# Patient Record
Sex: MALE | Race: WHITE | NOT HISPANIC OR LATINO | Employment: FULL TIME | URBAN - METROPOLITAN AREA
[De-identification: names, ages, dates, MRNs, and addresses within clinical notes are randomized per-mention and may not be internally consistent; named-entity substitution may affect disease eponyms.]

---

## 2017-12-01 ENCOUNTER — OFFICE VISIT - HEALTHEAST (OUTPATIENT)
Dept: FAMILY MEDICINE | Facility: CLINIC | Age: 32
End: 2017-12-01

## 2017-12-01 DIAGNOSIS — Z00.00 WELLNESS EXAMINATION: ICD-10-CM

## 2017-12-01 LAB
CHOLEST SERPL-MCNC: 198 MG/DL
FASTING STATUS PATIENT QL REPORTED: YES
HDLC SERPL-MCNC: 39 MG/DL
LDLC SERPL CALC-MCNC: 145 MG/DL
TRIGL SERPL-MCNC: 71 MG/DL

## 2017-12-01 ASSESSMENT — MIFFLIN-ST. JEOR: SCORE: 2021.48

## 2017-12-04 ENCOUNTER — COMMUNICATION - HEALTHEAST (OUTPATIENT)
Dept: FAMILY MEDICINE | Facility: CLINIC | Age: 32
End: 2017-12-04

## 2018-03-11 ENCOUNTER — OFFICE VISIT - HEALTHEAST (OUTPATIENT)
Dept: FAMILY MEDICINE | Facility: CLINIC | Age: 33
End: 2018-03-11

## 2018-03-11 DIAGNOSIS — J02.9 SORE THROAT: ICD-10-CM

## 2018-03-11 LAB — DEPRECATED S PYO AG THROAT QL EIA: NORMAL

## 2018-03-12 LAB — GROUP A STREP BY PCR: NORMAL

## 2018-04-16 ENCOUNTER — TELEPHONE (OUTPATIENT)
Dept: OTHER | Facility: CLINIC | Age: 33
End: 2018-04-16

## 2018-11-19 ENCOUNTER — COMMUNICATION - HEALTHEAST (OUTPATIENT)
Dept: FAMILY MEDICINE | Facility: CLINIC | Age: 33
End: 2018-11-19

## 2018-11-19 ENCOUNTER — OFFICE VISIT - HEALTHEAST (OUTPATIENT)
Dept: FAMILY MEDICINE | Facility: CLINIC | Age: 33
End: 2018-11-19

## 2018-11-19 DIAGNOSIS — E66.3 OVERWEIGHT: ICD-10-CM

## 2018-11-19 DIAGNOSIS — Z00.00 WELLNESS EXAMINATION: ICD-10-CM

## 2018-11-19 LAB
ANION GAP SERPL CALCULATED.3IONS-SCNC: 8 MMOL/L (ref 5–18)
BUN SERPL-MCNC: 14 MG/DL (ref 8–22)
CALCIUM SERPL-MCNC: 9.5 MG/DL (ref 8.5–10.5)
CHLORIDE BLD-SCNC: 107 MMOL/L (ref 98–107)
CHOLEST SERPL-MCNC: 188 MG/DL
CO2 SERPL-SCNC: 26 MMOL/L (ref 22–31)
CREAT SERPL-MCNC: 1.11 MG/DL (ref 0.7–1.3)
ERYTHROCYTE [DISTWIDTH] IN BLOOD BY AUTOMATED COUNT: 11.4 % (ref 11–14.5)
FASTING STATUS PATIENT QL REPORTED: YES
GFR SERPL CREATININE-BSD FRML MDRD: >60 ML/MIN/1.73M2
GLUCOSE BLD-MCNC: 97 MG/DL (ref 70–125)
HCT VFR BLD AUTO: 45.2 % (ref 40–54)
HDLC SERPL-MCNC: 41 MG/DL
HGB BLD-MCNC: 15.3 G/DL (ref 14–18)
LDLC SERPL CALC-MCNC: 132 MG/DL
MCH RBC QN AUTO: 29.3 PG (ref 27–34)
MCHC RBC AUTO-ENTMCNC: 33.9 G/DL (ref 32–36)
MCV RBC AUTO: 86 FL (ref 80–100)
PLATELET # BLD AUTO: 245 THOU/UL (ref 140–440)
PMV BLD AUTO: 9 FL (ref 7–10)
POTASSIUM BLD-SCNC: 4.4 MMOL/L (ref 3.5–5)
RBC # BLD AUTO: 5.23 MILL/UL (ref 4.4–6.2)
SODIUM SERPL-SCNC: 141 MMOL/L (ref 136–145)
TRIGL SERPL-MCNC: 74 MG/DL
WBC: 5.8 THOU/UL (ref 4–11)

## 2018-11-19 RX ORDER — ASCORBIC ACID 500 MG
500 TABLET ORAL DAILY
Status: SHIPPED | COMMUNITY
Start: 2015-11-11

## 2018-11-19 ASSESSMENT — MIFFLIN-ST. JEOR: SCORE: 2076.76

## 2018-12-21 ENCOUNTER — OFFICE VISIT - HEALTHEAST (OUTPATIENT)
Dept: FAMILY MEDICINE | Facility: CLINIC | Age: 33
End: 2018-12-21

## 2018-12-21 DIAGNOSIS — R07.0 THROAT PAIN: ICD-10-CM

## 2018-12-21 DIAGNOSIS — J40 BRONCHITIS: ICD-10-CM

## 2018-12-21 LAB — DEPRECATED S PYO AG THROAT QL EIA: NORMAL

## 2018-12-22 LAB — GROUP A STREP BY PCR: NORMAL

## 2019-11-21 ENCOUNTER — OFFICE VISIT - HEALTHEAST (OUTPATIENT)
Dept: FAMILY MEDICINE | Facility: CLINIC | Age: 34
End: 2019-11-21

## 2019-11-21 ENCOUNTER — COMMUNICATION - HEALTHEAST (OUTPATIENT)
Dept: FAMILY MEDICINE | Facility: CLINIC | Age: 34
End: 2019-11-21

## 2019-11-21 DIAGNOSIS — Z71.89 COUNSELING ON HEALTH CARE DIRECTIVE: ICD-10-CM

## 2019-11-21 DIAGNOSIS — Z00.00 ANNUAL PHYSICAL EXAM: ICD-10-CM

## 2019-11-21 DIAGNOSIS — Z23 NEED FOR MMR VACCINE: ICD-10-CM

## 2019-11-21 LAB
CHOLEST SERPL-MCNC: 186 MG/DL
FASTING STATUS PATIENT QL REPORTED: YES
FASTING STATUS PATIENT QL REPORTED: YES
GLUCOSE BLD-MCNC: 99 MG/DL (ref 79–116)
HDLC SERPL-MCNC: 43 MG/DL
LDLC SERPL CALC-MCNC: 128 MG/DL
TRIGL SERPL-MCNC: 73 MG/DL

## 2019-11-21 RX ORDER — CETIRIZINE HYDROCHLORIDE 10 MG/1
10 TABLET ORAL DAILY
Status: SHIPPED | COMMUNITY
Start: 2019-11-21

## 2019-11-21 RX ORDER — CHLORAL HYDRATE 500 MG
2 CAPSULE ORAL DAILY
Status: SHIPPED | COMMUNITY
Start: 2019-11-21

## 2019-11-21 ASSESSMENT — MIFFLIN-ST. JEOR: SCORE: 1968.19

## 2020-03-10 ENCOUNTER — HEALTH MAINTENANCE LETTER (OUTPATIENT)
Age: 35
End: 2020-03-10

## 2020-12-20 ENCOUNTER — HEALTH MAINTENANCE LETTER (OUTPATIENT)
Age: 35
End: 2020-12-20

## 2021-04-24 ENCOUNTER — HEALTH MAINTENANCE LETTER (OUTPATIENT)
Age: 36
End: 2021-04-24

## 2021-05-18 ENCOUNTER — OFFICE VISIT - HEALTHEAST (OUTPATIENT)
Dept: FAMILY MEDICINE | Facility: CLINIC | Age: 36
End: 2021-05-18

## 2021-05-18 ENCOUNTER — COMMUNICATION - HEALTHEAST (OUTPATIENT)
Dept: TELEHEALTH | Facility: CLINIC | Age: 36
End: 2021-05-18

## 2021-05-18 DIAGNOSIS — Z00.00 WELLNESS EXAMINATION: ICD-10-CM

## 2021-05-18 LAB
ANION GAP SERPL CALCULATED.3IONS-SCNC: 9 MMOL/L (ref 5–18)
BUN SERPL-MCNC: 19 MG/DL (ref 8–22)
CALCIUM SERPL-MCNC: 8.7 MG/DL (ref 8.5–10.5)
CHLORIDE BLD-SCNC: 105 MMOL/L (ref 98–107)
CHOLEST SERPL-MCNC: 184 MG/DL
CO2 SERPL-SCNC: 26 MMOL/L (ref 22–31)
CREAT SERPL-MCNC: 1.25 MG/DL (ref 0.7–1.3)
ERYTHROCYTE [DISTWIDTH] IN BLOOD BY AUTOMATED COUNT: 11.7 % (ref 11–14.5)
FASTING STATUS PATIENT QL REPORTED: YES
GFR SERPL CREATININE-BSD FRML MDRD: >60 ML/MIN/1.73M2
GLUCOSE BLD-MCNC: 97 MG/DL (ref 70–125)
HCT VFR BLD AUTO: 44.3 % (ref 40–54)
HDLC SERPL-MCNC: 46 MG/DL
HGB BLD-MCNC: 14.9 G/DL (ref 14–18)
LDLC SERPL CALC-MCNC: 124 MG/DL
MCH RBC QN AUTO: 29.2 PG (ref 27–34)
MCHC RBC AUTO-ENTMCNC: 33.6 G/DL (ref 32–36)
MCV RBC AUTO: 87 FL (ref 80–100)
PLATELET # BLD AUTO: 272 THOU/UL (ref 140–440)
PMV BLD AUTO: 10.8 FL (ref 7–10)
POTASSIUM BLD-SCNC: 4 MMOL/L (ref 3.5–5)
RBC # BLD AUTO: 5.11 MILL/UL (ref 4.4–6.2)
SODIUM SERPL-SCNC: 140 MMOL/L (ref 136–145)
TRIGL SERPL-MCNC: 70 MG/DL
WBC: 6.3 THOU/UL (ref 4–11)

## 2021-05-18 ASSESSMENT — MIFFLIN-ST. JEOR: SCORE: 2041.9

## 2021-05-27 VITALS
DIASTOLIC BLOOD PRESSURE: 86 MMHG | HEIGHT: 69 IN | SYSTOLIC BLOOD PRESSURE: 110 MMHG | OXYGEN SATURATION: 98 % | HEART RATE: 75 BPM | BODY MASS INDEX: 36.88 KG/M2 | WEIGHT: 249 LBS

## 2021-05-31 VITALS — HEIGHT: 69 IN | BODY MASS INDEX: 36.21 KG/M2 | WEIGHT: 244.5 LBS

## 2021-06-01 VITALS — WEIGHT: 238.3 LBS | BODY MASS INDEX: 35.71 KG/M2

## 2021-06-02 VITALS — WEIGHT: 241.2 LBS | BODY MASS INDEX: 34.61 KG/M2

## 2021-06-02 VITALS — BODY MASS INDEX: 36 KG/M2 | HEIGHT: 70 IN | WEIGHT: 251.44 LBS

## 2021-06-03 NOTE — PATIENT INSTRUCTIONS - HE
"MMR booster given today.    Your hep B should be ok given that I have records if you receiving the full series very recently.    I would encourage some weight loss.  That calorie counting ethan we talked about is called \"Lose It!\".    I usually comment on labs and imaging after they are all resulted within 2 business days. If you haven't heard your results within a week, please contact the office.    Thank you for coming in today!    If you receive a survey from [employer] about your experience today, it would be very helpful if you could fill it out to let us know what went well and what we can improve!    General Information:    Today you had your appointment with Adán Ardon NP    My hours are:    Monday : Out of clinic  Tuesday : 8:00AM - 5:00 PM  Wednesday: 8:00AM - 5:00 PM  Thursday: 8:00AM - 5:00 PM  Friday: 8:00AM - 5:00 PM    I am not in the office Mondays. Therefore non-urgent calls and medical messages received on Monday will be addressed when I am back in the office on Tuesday. Urgent matters will be reviewed and addressed by one of my partners in the office as needed.    If lab work was done today as part of your evaluation you will generally be contacted via [employer], mail, or phone with the results within 1-5 days. If there is an alarming result we will contact you by phone. Lab results come back at varying times, I generally wait until all lab results are available before making comments on the results.     If you need refills please contact your pharmacy. They will send a refill request to me to review. Please allow 3-5 business days for us to process all refill requests.     My Clinical Assistant is Kimberly. Please call us at [phone] or send a medical message with any questions or concerns.     "

## 2021-06-03 NOTE — PROGRESS NOTES
Assessment:      Healthy male exam.      Plan:      1. Annual physical exam  Lipid Cascade FASTING    Glucose   2. Need for MMR vaccine  MMR vaccine subcutaneous   3. Counseling on health care directive       Discussed with the patient that we can either do an adult booster of MMR or check his titers.  He did rather just get a booster today which was performed.  Counseling and healthcare directive provided.  Screening labs ordered.  Congratulated patient on his weight loss and discussed continued strategies for long-term weight loss management.    Subjective:      Vitaly Mcarthur is a 34 y.o. male who presents for an annual exam. The patient reports that there is not domestic violence in his life.  Overall patient is doing okay.  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  Unfortunately he and his wife are currently going through divorce right now which is a little stressful for him.  His primary concern today is his immunization status for MMR.  I do not have any records of him receiving MMR in the past and he is not 100% positive he did.  His job includes exposure to many communities that are under vaccinated and so he is wondering about what next steps are.    Healthy Habits:   Regular Exercise: Yes  Sunscreen Use: Yes  Healthy Diet: Yes  Dental Visits Regularly: Yes  Seat Belt: Yes  Sexually active: No  Monthly Self Testicular Exams:  Yes  Hemoccults: No  Flex Sig: No  Colonoscopy: No  Lipid Profile: Yes  Glucose Screen: Yes  Prevention of Osteoporosis: Yes  Last Dexa: No  Guns at Home:  Yes  Gun safe/class:  Yes      Immunization History   Administered Date(s) Administered     Hep B, Adult 09/14/2011, 10/25/2011, 07/09/2015     Influenza, Seasonal, Inj PF IIV3 11/25/2014     Influenza, inj, historic,unspecified 09/01/2017, 10/01/2018     Tdap 10/08/2012     Immunization status: up to date and documented.    No exam data present    Current Outpatient  Medications   Medication Sig Dispense Refill     ascorbic acid, vitamin C, (VITAMIN C) 500 MG tablet Take 500 mg by mouth.       cetirizine (ZYRTEC) 10 MG tablet Take 10 mg by mouth daily.       cholecalciferol, vitamin D3, (CHOLECALCIFEROL) 1,000 unit (25 mcg) tablet Take 1,000 Units by mouth daily.       multivitamin (ONE A DAY) per tablet Take 1 tablet by mouth.       omega-3/dha/epa/fish oil (FISH OIL-OMEGA-3 FATTY ACIDS) 300-1,000 mg capsule Take 2 g by mouth daily.       ubidecarenone (CO Q-10 ORAL) Take by mouth.       ZINC ORAL Take by mouth.       No current facility-administered medications for this visit.      No past medical history on file.  Past Surgical History:   Procedure Laterality Date     [place] REMOVE TONSILS/ADENOIDS,<13 Y/O      Description: Tonsillectomy With Adenoidectomy;  Recorded: 11/25/2014;     Patient has no known allergies.  No family history on file.  Social History     Socioeconomic History     Marital status:      Spouse name: Not on file     Number of children: Not on file     Years of education: Not on file     Highest education level: Not on file   Occupational History     Not on file   Social Needs     Financial resource strain: Not on file     Food insecurity:     Worry: Not on file     Inability: Not on file     Transportation needs:     Medical: Not on file     Non-medical: Not on file   Tobacco Use     Smoking status: Never Smoker     Smokeless tobacco: Never Used   Substance and Sexual Activity     Alcohol use: Not on file     Drug use: Not on file     Sexual activity: Not on file   Lifestyle     Physical activity:     Days per week: Not on file     Minutes per session: Not on file     Stress: Not on file   Relationships     Social connections:     Talks on phone: Not on file     Gets together: Not on file     Attends Worship service: Not on file     Active member of club or organization: Not on file     Attends meetings of clubs or organizations: Not on file      "Relationship status: Not on file     Intimate partner violence:     Fear of current or ex partner: Not on file     Emotionally abused: Not on file     Physically abused: Not on file     Forced sexual activity: Not on file   Other Topics Concern     Not on file   Social History Narrative     Not on file       Review of Systems  General:  Denies problem  Eyes: Denies problem  Ears/Nose/Throat: Denies problem  Cardiovascular: Denies problem  Respiratory:  Denies problem  Gastrointestinal:  Denies problem  Genitourinary: Denies problem  Musculoskeletal:  Denies problem  Skin: Denies problem  Neurologic: Denies problem  Psychiatric: Denies problem  Endocrine: Denies problem  Heme/Lymphatic: Denies problem   Allergic/Immunologic: Denies problem        Objective:     Vitals:    11/21/19 0805   BP: 128/78   Pulse: 79   Temp: 97.8  F (36.6  C)   TempSrc: Oral   SpO2: 96%   Weight: (!) 231 lb (104.8 kg)   Height: 5' 9\" (1.753 m)     Body mass index is 34.11 kg/m .    Physical  General Appearance: Alert, cooperative, no distress, appears stated age  Head: Normocephalic, without obvious abnormality, atraumatic  Eyes: PERRL, conjunctiva/corneas clear, EOM's intact  Ears: Normal TM's and external ear canals, both ears  Nose: Nares normal, septum midline,mucosa normal, no drainage  Throat: Lips, mucosa, and tongue normal; teeth and gums normal  Neck: Supple, symmetrical, trachea midline, no adenopathy;  thyroid: not enlarged, symmetric, no tenderness/mass/nodules; no carotid bruit or JVD  Back: Symmetric, no curvature, ROM normal, no CVA tenderness  Lungs: Clear to auscultation bilaterally, respirations unlabored  Heart: Regular rate and rhythm, S1 and S2 normal, no murmur, rub, or gallop,  Abdomen: Soft, non-tender, bowel sounds active all four quadrants,  no masses, no organomegaly  Genitourinary: Penis normal. Right testis is descended. Left testis is descended.   Musculoskeletal: Normal range of motion. No joint swelling or " deformity.   Extremities: Extremities normal, atraumatic, no cyanosis or edema  Skin: Skin color, texture, turgor normal, no rashes or lesions  Lymph nodes: Cervical, supraclavicular, and axillary nodes normal  Neurologic: He is alert. He has normal reflexes.   Psychiatric: He has a normal mood and affect.      Adán Ardon, CNP

## 2021-06-04 VITALS
BODY MASS INDEX: 34.21 KG/M2 | TEMPERATURE: 97.8 F | OXYGEN SATURATION: 96 % | WEIGHT: 231 LBS | DIASTOLIC BLOOD PRESSURE: 78 MMHG | HEART RATE: 79 BPM | SYSTOLIC BLOOD PRESSURE: 128 MMHG | HEIGHT: 69 IN

## 2021-06-14 NOTE — PROGRESS NOTES
"Chief Complaint   Patient presents with     Annual Exam     Px       HPI: Very pleasant 32-year-old gentleman presents today for physical examination.  He is a [employer] , is  and has twin daughters.  He is healthy and has no complaints.  He works out frequently at the local fitness establishment.    ROS: No bowel or bladder issues.  No fever chills.    SH: He does not smoke   FH: The Patient's family history is not on file.    Meds:  Vitaly currently has no medications in their medication list.    O:  /72  Pulse 76  Temp 97.6  F (36.4  C)  Resp 20  Ht 5' 8.5\" (1.74 m)  Wt (!) 244 lb 8 oz (110.9 kg)  SpO2 97%  BMI 36.64 kg/m2   Constitutional:    --Vitals as above  --No acute distress  Eyes-  --Sclera noninjected  --Lids and conjunctiva normal  ENT-  --TMs clear  --Sclera noninjected  --Pharynx not erythematous  Neck-  --Neck supple with no cervical lymphadenopathy  Lungs-  --Clear to Auscultation  Heart-  --Regular rate and rhythm  Abdomen--  --Soft, non-tender, non-distended  Skin-  --Pink and dry  Psych-  --Alert and oriented  --Normal affect      A/P:   1. Wellness examination  -Encouraged healthy weight  - HM2(CBC w/o Differential)  - Basic Metabolic Panel  - Lipid Cascade                                          "

## 2021-06-16 NOTE — PROGRESS NOTES
"  Assessment/Plan:     Patient presents with symptoms concerning for strep pharyngitis.  Rapid strep swab returned negative, final culture pending.  Handout on symptomatic treatment for sore throats was given to patient.    Problem List Items Addressed This Visit     None      Visit Diagnoses     Sore throat    -  Primary    Relevant Orders    Rapid Strep A Screen-Throat          Return to clinic PRN.    Total time spent with patient was 15 minutes with greater than 50% spent in face-to-face counseling regarding the above plan.    Subjective:      Vitaly Mcarthur is a 32 y.o. male who presents to clinic for sore throat.    Patient has been feeling poorly for the past 3 or 4 days.  It is worsening.  He is having no shortness of breath or difficulty breathing, but he does endorse some difficulty swallowing.  His biggest concern is a sore throat.  He has been treating with Mucinex and Sudafed.  He expected with just a mild cold but the sore throat is bothering him significantly.  He is not endorsing any other significant sinus symptoms, although this may be mitigated by the medication is taking preemptively.  He is not having any gastrointestinal symptoms.  He does not feel as though the \"cold is in his chest\".  He is not prone to sore throats.  He specifically denies fever and cough.    Past Medical History, Family History, and Social History reviewed.     No current outpatient prescriptions on file prior to visit.     No current facility-administered medications on file prior to visit.        Review of systems is as stated in HPI.  The remainder of the 10 system review is otherwise negative.    Objective:     /78 (Patient Site: Right Arm, Patient Position: Sitting, Cuff Size: Adult Large)  Pulse 89  Temp 98.4  F (36.9  C) (Oral)   Resp 16  Wt (!) 238 lb 4.8 oz (108.1 kg)  SpO2 98%  BMI 35.71 kg/m2 Body mass index is 35.71 kg/(m^2).    Gen: Alert, NAD, appears stated age, normal hygiene   Eyes: conjunctivae " without injection, sclera clear, EOMI  ENT/mouth: nares clear, septum midline, absent rhinorrhea, significant pharyngeal injection with tonsillar edema, neck is supple, no thyroid enlargement  CV: RRR, no murmur appreciated, pedal edema absent bilaterally  Resp: CTAB, no wheezes, rales or ronchi  ABD: non-tender to palpation, nondistended  MSK: grossly full range of motion in all joints, no obvious deformity  Neuro: CN II-XII grossly intact, no deficits in coordination  Psych: no apparent hallucinations or delusions, no pressured speech; alert, oriented x3  SKIN: dry and without lesions  Heme/lymph: no pallor, no active bleeding/bruising, no adenopathy appreciated    This note has been dictated using voice recognition software. Any grammatical or context distortions are unintentional and inherent to the the software.     Kathy Coppola MD

## 2021-06-17 NOTE — PROGRESS NOTES
S:  35-year-old male who works as a  in [employer].  He presents today for annual physical.  His stress level is increased slightly as he is currently undergoing a divorce.  He works out 5 days a week in a gym and lifts weights vigorously.  He has no medical concerns.    Medications: Zyrtec, multivitamins    O:   Blood pressure 110/86, pulse 75, respiration 12, weight 249  Constitutional:    --Vitals as above  --No acute distress  Eyes-  --Sclera noninjected  --Lids and conjunctiva normal  ENT-  --TMs clear  --Sclera noninjected  --Pharynx not erythematous  Neck-  --Neck supple with no cervical lymphadenopathy  Lungs-  --Clear to Auscultation  Heart-  --Regular rate and rhythm  Abdomen--  --Soft, non-tender, non-distended  Skin-  --Pink and dry  Psych-  --Alert and oriented  --Normal affect      A:   Healthy male  Over nourishment    P:   Encouraged healthy lifestyle and exercise  Encourage weight loss  Reviewed immunizations  CBC BMP lipid  RTC 1 year

## 2021-06-19 NOTE — LETTER
Letter by Adán Ardon CNP at      Author: Adán Ardon CNP Service: -- Author Type: --    Filed:  Encounter Date: 11/21/2019 Status: Signed         Vitaly Mcarthur  [address] [place] [facility] [address]             November 21, 2019         Dear Mr. Mcarthur,    Below are the results from your recent visit:    Resulted Orders   Lipid [place] FASTING   Result Value Ref Range    Cholesterol 186 <=199 mg/dL    Triglycerides 73 <=149 mg/dL    HDL Cholesterol 43 >=40 mg/dL    LDL Calculated 128 <=129 mg/dL    Patient Fasting > 8hrs? Yes    Glucose   Result Value Ref Range    Glucose 99 79 - 116 mg/dL    Patient Fasting > 8hrs? Yes     Narrative    Fasting Glucose reference range is 70-99 mg/dL per  American Diabetes Association (ADA) guidelines.       Fasting blood sugar and cholesterol look fine!    Please call with questions or contact us using MyChart.    Sincerely,         Adán Ardon CNP

## 2021-06-21 NOTE — PROGRESS NOTES
Assessment:      Healthy male exam.      Plan:    1. Wellness examination  We discussed wellness items including exercise, weight loss, and healthy sleep patterns.  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  He is also requesting that his vitamin D be checked because he has a coworker was deficient in vitamin D  - HM2(CBC w/o Differential)  - Basic Metabolic Panel  - Lipid Cascade    2. Overweight  We discussed his weight and he lifts weights for exercise.  The patient is aware that he needs to lose weight and will continue to be working on that       All questions answered.     Subjective:      Vitaly Mcarthur is a 33 y.o. male who presents for an annual exam. Vitaly presents today for physical exam.  He works in the [employer] in [place] as a .  Unfortunately he works nights in his wake sleep pattern is irregular.  He also has twin girls which require a fair amount of time.  He is concerned about not getting enough sleep.  He is also requesting vitamin D to be checked because he had a coworker who was deficient in vitamin D.    Healthy Habits:   Regular Exercise: Yes and No  Sunscreen Use: No  Healthy Diet: No  Dental Visits Regularly: Yes  Seat Belt: Yes  Sexually active: Yes  Monthly Self Testicular Exams:  No  Hemoccults: No  Flex Sig: No  Colonoscopy: No  Lipid Profile: Yes  Glucose Screen: Yes  Prevention of Osteoporosis: Yes  Last Dexa: No  Guns at Home:  Yes  Guns Safety Locks:  Yes      Immunization History   Administered Date(s) Administered     Influenza, Seasonal, Inj PF IIV3 11/25/2014     Influenza, inj, historic,unspecified 09/01/2017     Immunization status: up to date and documented.    No exam data present     Current Outpatient Medications   Medication Sig Dispense Refill     ascorbic acid, vitamin C, (VITAMIN C) 500 MG tablet Take 500 mg by mouth.       multivitamin (ONE A DAY) per tablet  "Take 1 tablet by mouth.       No current facility-administered medications for this visit.      No past medical history on file.  Past Surgical History:   Procedure Laterality Date     [place] REMOVE TONSILS/ADENOIDS,<10 Y/O      Description: Tonsillectomy With Adenoidectomy;  Recorded: 11/25/2014;     Patient has no known allergies.  No family history on file.  Social History     Socioeconomic History     Marital status:      Spouse name: Not on file     Number of children: Not on file     Years of education: Not on file     Highest education level: Not on file   Social Needs     Financial resource strain: Not on file     Food insecurity - worry: Not on file     Food insecurity - inability: Not on file     Transportation needs - medical: Not on file     Transportation needs - non-medical: Not on file   Occupational History     Not on file   Tobacco Use     Smoking status: Never Smoker     Smokeless tobacco: Never Used   Substance and Sexual Activity     Alcohol use: Not on file     Drug use: Not on file     Sexual activity: Not on file   Other Topics Concern     Not on file   Social History Narrative     Not on file       Review of Systems  Review of Systems          Objective:     Vitals:    11/19/18 0807   BP: 124/80   Pulse: 76   Resp: 16   SpO2: 96%   Weight: (!) 251 lb 7 oz (114.1 kg)   Height: 5' 10\" (1.778 m)     Body mass index is 36.08 kg/m .    Physical  Physical Exam     Constitutional:    --Vitals as above  --No acute distress  Eyes-  --Sclera noninjected  --Lids and conjunctiva normal  ENT-  --TMs clear  --Sclera noninjected  --Pharynx not erythematous  Neck-  --Neck supple with no cervical lymphadenopathy  Lungs-  --Clear to Auscultation  Heart-  --Regular rate and rhythm  Abdomen--  --Soft, non-tender, non-distended  Skin-  --Pink and dry  Psych-  --Alert and oriented  --Normal affect        "

## 2021-06-22 NOTE — PROGRESS NOTES
Assessment:     1. Throat pain  Rapid Strep A Screen-Throat    Group A Strep, RNA Direct Detection, Throat   2. Bronchitis  amoxicillin-clavulanate (AUGMENTIN) 875-125 mg per tablet     Results for orders placed or performed in visit on 12/21/18   Rapid Strep A Screen-Throat   Result Value Ref Range    Rapid Strep A Antigen No Group A Strep detected, presumptive negative No Group A Strep detected, presumptive negative            Plan:     Differential diagnosis include but not limited to pharyngitis, bacterial bronchitis, strep pharyngitis.  Discussed with the patient on exam finding, abnormal lung sounds, patient also coughing throughout the visit.  Rapid strep done, negative.  We will treat patient for bacterial bronchitis with Augmentin twice daily times 10 days.  Advised patient he may also use over-the-counter medication Delsym or Mucinex for the cough.  Increase fluid intake.  May take ibuprofen or Tylenol for headache.  Monitor for worsening symptoms    I discussed red flag symptoms, return precautions to clinic/ER and follow up care with patient/parent.  Expected clinical course, symptomatic cares advised. Questions answered. Patient/parent amenable with plan.       Subjective:       33 y.o. male presents for evaluation of a cough.  Patient reports that he has been having symptoms for about 2 weeks.  He also admits to a sore throat for the past 3 days.  He has been having body aches and chills that was only yesterday which resolved after taking ibuprofen.  He admits that initially his cough was getting better and now he feels like it is getting worse.  The cough is nonproductive but is deep and is constant.  He denies nausea, vomiting, diarrhea, loss of appetite.  He admits to shortness of breath with extensive coughing.  Patient works as a  therefore not sure if he has been exposed to bronchitis or pneumonia.  He has been taking Mucinex for the past 2 weeks that gives him no symptom relief.   He also has been taking Sudafed that is not effective.  The last few nights he has been using NyQuil at night therefore he could sleep which he wakes up at least 2-3 times a night.    The following portions of the patient's history were reviewed and updated as appropriate: allergies, current medications, past family history, past medical history, past social history, past surgical history and problem list.    Review of Systems  A 12 point comprehensive review of systems was negative except as noted.      Objective:      /72 (Patient Site: Right Arm, Patient Position: Sitting, Cuff Size: Adult Large)   Pulse (!) 101   Temp 98.1  F (36.7  C) (Oral)   Resp 16   Wt (!) 241 lb 3.2 oz (109.4 kg)   SpO2 96%   BMI 34.61 kg/m    General appearance: alert, appears stated age, cooperative and moderate distress  Head: Normocephalic, without obvious abnormality, atraumatic, sinuses nontender to percussion  Eyes: conjunctivae/corneas clear. PERRL, EOM's intact. Fundi benign.  Ears: abnormal TM right ear - bulging and air-fluid level and abnormal TM left ear - bulging and air-fluid level  Nose: Nares normal. Septum midline. Mucosa normal. No drainage or sinus tenderness., copious and mucoid discharge, moderate congestion, no sinus tenderness  Throat: abnormal findings: moderate oropharyngeal erythema  Lungs: rhonchi bilaterally  Heart: regular rate and rhythm, S1, S2 normal, no murmur, click, rub or gallop  Extremities: extremities normal, atraumatic, no cyanosis or edema  Pulses: 2+ and symmetric  Skin: Skin color, texture, turgor normal. No rashes or lesions  Lymph nodes: Cervical, supraclavicular, and axillary nodes normal.  Neurologic: Grossly normal        This note has been dictated using voice recognition software. Any grammatical or context distortions are unintentional and inherent to the software

## 2021-08-02 ENCOUNTER — HOSPITAL ENCOUNTER (INPATIENT)
Facility: CLINIC | Age: 36
Setting detail: SURGERY ADMIT
End: 2021-08-02
Attending: SPECIALIST | Admitting: SPECIALIST
Payer: COMMERCIAL

## 2021-08-02 ENCOUNTER — APPOINTMENT (OUTPATIENT)
Dept: CT IMAGING | Facility: CLINIC | Age: 36
End: 2021-08-02
Attending: EMERGENCY MEDICINE
Payer: COMMERCIAL

## 2021-08-02 ENCOUNTER — ANESTHESIA EVENT (OUTPATIENT)
Dept: SURGERY | Facility: CLINIC | Age: 36
End: 2021-08-02
Payer: COMMERCIAL

## 2021-08-02 ENCOUNTER — HOSPITAL ENCOUNTER (OUTPATIENT)
Facility: CLINIC | Age: 36
Discharge: HOME | End: 2021-08-02
Attending: EMERGENCY MEDICINE | Admitting: EMERGENCY MEDICINE
Payer: COMMERCIAL

## 2021-08-02 ENCOUNTER — ANESTHESIA (OUTPATIENT)
Dept: SURGERY | Facility: CLINIC | Age: 36
End: 2021-08-02
Payer: COMMERCIAL

## 2021-08-02 DIAGNOSIS — K35.30 ACUTE APPENDICITIS WITH LOCALIZED PERITONITIS, UNSPECIFIED WHETHER ABSCESS PRESENT, UNSPECIFIED WHETHER GANGRENE PRESENT, UNSPECIFIED WHETHER PERFORATION PRESENT: Primary | ICD-10-CM

## 2021-08-02 DIAGNOSIS — K35.30 ACUTE APPENDICITIS WITH LOCALIZED PERITONITIS, UNSPECIFIED WHETHER ABSCESS PRESENT, UNSPECIFIED WHETHER GANGRENE PRESENT, UNSPECIFIED WHETHER PERFORATION PRESENT: ICD-10-CM

## 2021-08-02 LAB
ALBUMIN UR-MCNC: 30 MG/DL
ANION GAP SERPL CALCULATED.3IONS-SCNC: 9 MMOL/L (ref 5–18)
APPEARANCE UR: CLEAR
BASOPHILS # BLD AUTO: 0 10E3/UL (ref 0–0.2)
BASOPHILS NFR BLD AUTO: 0 %
BILIRUB UR QL STRIP: NEGATIVE
BUN SERPL-MCNC: 13 MG/DL (ref 8–22)
CALCIUM SERPL-MCNC: 9.3 MG/DL (ref 8.5–10.5)
CHLORIDE BLD-SCNC: 107 MMOL/L (ref 98–107)
CO2 SERPL-SCNC: 23 MMOL/L (ref 22–31)
COLOR UR AUTO: ABNORMAL
CREAT SERPL-MCNC: 1.13 MG/DL (ref 0.7–1.3)
EOSINOPHIL # BLD AUTO: 0 10E3/UL (ref 0–0.7)
EOSINOPHIL NFR BLD AUTO: 0 %
ERYTHROCYTE [DISTWIDTH] IN BLOOD BY AUTOMATED COUNT: 11.7 % (ref 10–15)
GFR SERPL CREATININE-BSD FRML MDRD: 83 ML/MIN/1.73M2
GLUCOSE BLD-MCNC: 120 MG/DL (ref 70–125)
GLUCOSE UR STRIP-MCNC: NEGATIVE MG/DL
HCT VFR BLD AUTO: 45.2 % (ref 40–53)
HGB BLD-MCNC: 14.9 G/DL (ref 13.3–17.7)
HGB UR QL STRIP: ABNORMAL
HOLD SPECIMEN: NORMAL
HOLD SPECIMEN: NORMAL
IMM GRANULOCYTES # BLD: 0.1 10E3/UL
IMM GRANULOCYTES NFR BLD: 0 %
KETONES UR STRIP-MCNC: NEGATIVE MG/DL
LEUKOCYTE ESTERASE UR QL STRIP: NEGATIVE
LYMPHOCYTES # BLD AUTO: 0.7 10E3/UL (ref 0.8–5.3)
LYMPHOCYTES NFR BLD AUTO: 5 %
MCH RBC QN AUTO: 28.6 PG (ref 26.5–33)
MCHC RBC AUTO-ENTMCNC: 33 G/DL (ref 31.5–36.5)
MCV RBC AUTO: 87 FL (ref 78–100)
MONOCYTES # BLD AUTO: 1.1 10E3/UL (ref 0–1.3)
MONOCYTES NFR BLD AUTO: 7 %
NEUTROPHILS # BLD AUTO: 14 10E3/UL (ref 1.6–8.3)
NEUTROPHILS NFR BLD AUTO: 88 %
NITRATE UR QL: NEGATIVE
NRBC # BLD AUTO: 0 10E3/UL
NRBC BLD AUTO-RTO: 0 /100
PH UR STRIP: 6 [PH] (ref 5–7)
PLATELET # BLD AUTO: 259 10E3/UL (ref 150–450)
POTASSIUM BLD-SCNC: 4.3 MMOL/L (ref 3.5–5)
RBC # BLD AUTO: 5.21 10E6/UL (ref 4.4–5.9)
RBC URINE: 1 /HPF
SARS-COV-2 RNA RESP QL NAA+PROBE: NEGATIVE
SODIUM SERPL-SCNC: 139 MMOL/L (ref 136–145)
SP GR UR STRIP: >1.03 (ref 1–1.03)
UROBILINOGEN UR STRIP-MCNC: <2 MG/DL
WBC # BLD AUTO: 15.9 10E3/UL (ref 4–11)
WBC URINE: 1 /HPF

## 2021-08-02 PROCEDURE — 96375 TX/PRO/DX INJ NEW DRUG ADDON: CPT

## 2021-08-02 PROCEDURE — 370N000017 HC ANESTHESIA TECHNICAL FEE, PER MIN: Performed by: SPECIALIST

## 2021-08-02 PROCEDURE — 258N000001 HC RX 258: Performed by: SPECIALIST

## 2021-08-02 PROCEDURE — 81001 URINALYSIS AUTO W/SCOPE: CPT | Performed by: EMERGENCY MEDICINE

## 2021-08-02 PROCEDURE — 250N000011 HC RX IP 250 OP 636: Performed by: EMERGENCY MEDICINE

## 2021-08-02 PROCEDURE — 250N000009 HC RX 250: Performed by: NURSE ANESTHETIST, CERTIFIED REGISTERED

## 2021-08-02 PROCEDURE — 44970 LAPAROSCOPY APPENDECTOMY: CPT | Performed by: SPECIALIST

## 2021-08-02 PROCEDURE — 250N000025 HC SEVOFLURANE, PER MIN: Performed by: SPECIALIST

## 2021-08-02 PROCEDURE — 250N000011 HC RX IP 250 OP 636: Performed by: NURSE ANESTHETIST, CERTIFIED REGISTERED

## 2021-08-02 PROCEDURE — 710N000012 HC RECOVERY PHASE 2, PER MINUTE: Performed by: SPECIALIST

## 2021-08-02 PROCEDURE — C9803 HOPD COVID-19 SPEC COLLECT: HCPCS

## 2021-08-02 PROCEDURE — 272N000001 HC OR GENERAL SUPPLY STERILE: Performed by: SPECIALIST

## 2021-08-02 PROCEDURE — 258N000003 HC RX IP 258 OP 636: Performed by: NURSE ANESTHETIST, CERTIFIED REGISTERED

## 2021-08-02 PROCEDURE — 85025 COMPLETE CBC W/AUTO DIFF WBC: CPT | Performed by: EMERGENCY MEDICINE

## 2021-08-02 PROCEDURE — 93005 ELECTROCARDIOGRAM TRACING: CPT

## 2021-08-02 PROCEDURE — 74177 CT ABD & PELVIS W/CONTRAST: CPT

## 2021-08-02 PROCEDURE — 36415 COLL VENOUS BLD VENIPUNCTURE: CPT | Performed by: EMERGENCY MEDICINE

## 2021-08-02 PROCEDURE — 250N000011 HC RX IP 250 OP 636: Performed by: SPECIALIST

## 2021-08-02 PROCEDURE — 87635 SARS-COV-2 COVID-19 AMP PRB: CPT | Performed by: EMERGENCY MEDICINE

## 2021-08-02 PROCEDURE — 96365 THER/PROPH/DIAG IV INF INIT: CPT | Mod: 59

## 2021-08-02 PROCEDURE — 80048 BASIC METABOLIC PNL TOTAL CA: CPT | Performed by: EMERGENCY MEDICINE

## 2021-08-02 PROCEDURE — 710N000010 HC RECOVERY PHASE 1, LEVEL 2, PER MIN: Performed by: SPECIALIST

## 2021-08-02 PROCEDURE — 360N000076 HC SURGERY LEVEL 3, PER MIN: Performed by: SPECIALIST

## 2021-08-02 PROCEDURE — 93005 ELECTROCARDIOGRAM TRACING: CPT | Performed by: EMERGENCY MEDICINE

## 2021-08-02 PROCEDURE — 99285 EMERGENCY DEPT VISIT HI MDM: CPT | Mod: 25

## 2021-08-02 PROCEDURE — 99204 OFFICE O/P NEW MOD 45 MIN: CPT | Mod: 57 | Performed by: SPECIALIST

## 2021-08-02 PROCEDURE — 88304 TISSUE EXAM BY PATHOLOGIST: CPT | Mod: TC | Performed by: SPECIALIST

## 2021-08-02 RX ORDER — SODIUM CHLORIDE, SODIUM LACTATE, POTASSIUM CHLORIDE, CALCIUM CHLORIDE 600; 310; 30; 20 MG/100ML; MG/100ML; MG/100ML; MG/100ML
INJECTION, SOLUTION INTRAVENOUS CONTINUOUS PRN
Status: DISCONTINUED | OUTPATIENT
Start: 2021-08-02 | End: 2021-08-02

## 2021-08-02 RX ORDER — ONDANSETRON 2 MG/ML
4 INJECTION INTRAMUSCULAR; INTRAVENOUS EVERY 30 MIN PRN
Status: DISCONTINUED | OUTPATIENT
Start: 2021-08-02 | End: 2021-08-03 | Stop reason: HOSPADM

## 2021-08-02 RX ORDER — IOPAMIDOL 755 MG/ML
100 INJECTION, SOLUTION INTRAVASCULAR ONCE
Status: COMPLETED | OUTPATIENT
Start: 2021-08-02 | End: 2021-08-02

## 2021-08-02 RX ORDER — HYDROMORPHONE HCL IN WATER/PF 6 MG/30 ML
0.2 PATIENT CONTROLLED ANALGESIA SYRINGE INTRAVENOUS EVERY 5 MIN PRN
Status: DISCONTINUED | OUTPATIENT
Start: 2021-08-02 | End: 2021-08-02 | Stop reason: HOSPADM

## 2021-08-02 RX ORDER — KETOROLAC TROMETHAMINE 15 MG/ML
15 INJECTION, SOLUTION INTRAMUSCULAR; INTRAVENOUS ONCE
Status: COMPLETED | OUTPATIENT
Start: 2021-08-02 | End: 2021-08-02

## 2021-08-02 RX ORDER — ONDANSETRON 2 MG/ML
4 INJECTION INTRAMUSCULAR; INTRAVENOUS ONCE
Status: COMPLETED | OUTPATIENT
Start: 2021-08-02 | End: 2021-08-02

## 2021-08-02 RX ORDER — LIDOCAINE 40 MG/G
CREAM TOPICAL
Status: DISCONTINUED | OUTPATIENT
Start: 2021-08-02 | End: 2021-08-03 | Stop reason: HOSPADM

## 2021-08-02 RX ORDER — PROPOFOL 10 MG/ML
INJECTION, EMULSION INTRAVENOUS PRN
Status: DISCONTINUED | OUTPATIENT
Start: 2021-08-02 | End: 2021-08-02

## 2021-08-02 RX ORDER — PIPERACILLIN SODIUM, TAZOBACTAM SODIUM 3; .375 G/15ML; G/15ML
3.38 INJECTION, POWDER, LYOPHILIZED, FOR SOLUTION INTRAVENOUS ONCE
Status: COMPLETED | OUTPATIENT
Start: 2021-08-02 | End: 2021-08-02

## 2021-08-02 RX ORDER — HALOPERIDOL 5 MG/ML
1 INJECTION INTRAMUSCULAR
Status: DISCONTINUED | OUTPATIENT
Start: 2021-08-02 | End: 2021-08-03 | Stop reason: HOSPADM

## 2021-08-02 RX ORDER — ONDANSETRON 2 MG/ML
INJECTION INTRAMUSCULAR; INTRAVENOUS PRN
Status: DISCONTINUED | OUTPATIENT
Start: 2021-08-02 | End: 2021-08-02

## 2021-08-02 RX ORDER — OXYCODONE HYDROCHLORIDE 5 MG/1
5 TABLET ORAL EVERY 4 HOURS PRN
Status: DISCONTINUED | OUTPATIENT
Start: 2021-08-02 | End: 2021-08-03 | Stop reason: HOSPADM

## 2021-08-02 RX ORDER — FENTANYL CITRATE 50 UG/ML
INJECTION, SOLUTION INTRAMUSCULAR; INTRAVENOUS PRN
Status: DISCONTINUED | OUTPATIENT
Start: 2021-08-02 | End: 2021-08-02

## 2021-08-02 RX ORDER — FENTANYL CITRATE 50 UG/ML
25 INJECTION, SOLUTION INTRAMUSCULAR; INTRAVENOUS EVERY 5 MIN PRN
Status: DISCONTINUED | OUTPATIENT
Start: 2021-08-02 | End: 2021-08-02 | Stop reason: HOSPADM

## 2021-08-02 RX ORDER — MEPERIDINE HYDROCHLORIDE 25 MG/ML
12.5 INJECTION INTRAMUSCULAR; INTRAVENOUS; SUBCUTANEOUS
Status: DISCONTINUED | OUTPATIENT
Start: 2021-08-02 | End: 2021-08-03 | Stop reason: HOSPADM

## 2021-08-02 RX ORDER — BUPIVACAINE HYDROCHLORIDE 2.5 MG/ML
INJECTION, SOLUTION INFILTRATION; PERINEURAL PRN
Status: DISCONTINUED | OUTPATIENT
Start: 2021-08-02 | End: 2021-08-02 | Stop reason: HOSPADM

## 2021-08-02 RX ORDER — SODIUM CHLORIDE, SODIUM LACTATE, POTASSIUM CHLORIDE, CALCIUM CHLORIDE 600; 310; 30; 20 MG/100ML; MG/100ML; MG/100ML; MG/100ML
INJECTION, SOLUTION INTRAVENOUS CONTINUOUS
Status: DISCONTINUED | OUTPATIENT
Start: 2021-08-02 | End: 2021-08-03 | Stop reason: HOSPADM

## 2021-08-02 RX ORDER — ONDANSETRON 4 MG/1
4 TABLET, ORALLY DISINTEGRATING ORAL EVERY 30 MIN PRN
Status: DISCONTINUED | OUTPATIENT
Start: 2021-08-02 | End: 2021-08-03 | Stop reason: HOSPADM

## 2021-08-02 RX ORDER — LIDOCAINE HYDROCHLORIDE 10 MG/ML
INJECTION, SOLUTION INFILTRATION; PERINEURAL PRN
Status: DISCONTINUED | OUTPATIENT
Start: 2021-08-02 | End: 2021-08-02

## 2021-08-02 RX ORDER — KETOROLAC TROMETHAMINE 30 MG/ML
INJECTION, SOLUTION INTRAMUSCULAR; INTRAVENOUS PRN
Status: DISCONTINUED | OUTPATIENT
Start: 2021-08-02 | End: 2021-08-02

## 2021-08-02 RX ORDER — HYDROCODONE BITARTRATE AND ACETAMINOPHEN 5; 325 MG/1; MG/1
1 TABLET ORAL EVERY 6 HOURS PRN
Qty: 18 TABLET | Refills: 0 | Status: SHIPPED | OUTPATIENT
Start: 2021-08-02 | End: 2021-08-05

## 2021-08-02 RX ORDER — ALBUTEROL SULFATE 0.83 MG/ML
2.5 SOLUTION RESPIRATORY (INHALATION) EVERY 4 HOURS PRN
Status: DISCONTINUED | OUTPATIENT
Start: 2021-08-02 | End: 2021-08-02 | Stop reason: HOSPADM

## 2021-08-02 RX ADMIN — FENTANYL CITRATE 100 MCG: 50 INJECTION, SOLUTION INTRAMUSCULAR; INTRAVENOUS at 21:41

## 2021-08-02 RX ADMIN — MIDAZOLAM HYDROCHLORIDE 2 MG: 1 INJECTION, SOLUTION INTRAMUSCULAR; INTRAVENOUS at 21:33

## 2021-08-02 RX ADMIN — ONDANSETRON 4 MG: 2 INJECTION INTRAMUSCULAR; INTRAVENOUS at 20:20

## 2021-08-02 RX ADMIN — PROPOFOL 200 MG: 10 INJECTION, EMULSION INTRAVENOUS at 21:41

## 2021-08-02 RX ADMIN — ONDANSETRON 4 MG: 2 INJECTION INTRAMUSCULAR; INTRAVENOUS at 21:33

## 2021-08-02 RX ADMIN — SUGAMMADEX 220 MG: 100 INJECTION, SOLUTION INTRAVENOUS at 22:16

## 2021-08-02 RX ADMIN — KETOROLAC TROMETHAMINE 15 MG: 30 INJECTION, SOLUTION INTRAMUSCULAR at 22:15

## 2021-08-02 RX ADMIN — IOPAMIDOL 100 ML: 755 INJECTION, SOLUTION INTRAVENOUS at 18:57

## 2021-08-02 RX ADMIN — ROCURONIUM BROMIDE 50 MG: 10 INJECTION, SOLUTION INTRAVENOUS at 21:41

## 2021-08-02 RX ADMIN — PIPERACILLIN AND TAZOBACTAM 3.38 G: 3; .375 INJECTION, POWDER, LYOPHILIZED, FOR SOLUTION INTRAVENOUS at 20:24

## 2021-08-02 RX ADMIN — SODIUM CHLORIDE, POTASSIUM CHLORIDE, SODIUM LACTATE AND CALCIUM CHLORIDE: 600; 310; 30; 20 INJECTION, SOLUTION INTRAVENOUS at 21:33

## 2021-08-02 RX ADMIN — LIDOCAINE HYDROCHLORIDE 30 MG: 10 INJECTION, SOLUTION INFILTRATION; PERINEURAL at 21:41

## 2021-08-02 RX ADMIN — KETOROLAC TROMETHAMINE 15 MG: 15 INJECTION, SOLUTION INTRAMUSCULAR; INTRAVENOUS at 20:22

## 2021-08-02 NOTE — ED TRIAGE NOTES
Pt presents to the ED with c/o abdominal bloating, emesis, and RLQ pain. Pt denies fevers but feels chills.

## 2021-08-03 VITALS
DIASTOLIC BLOOD PRESSURE: 71 MMHG | OXYGEN SATURATION: 96 % | TEMPERATURE: 99.2 F | HEART RATE: 96 BPM | SYSTOLIC BLOOD PRESSURE: 124 MMHG | RESPIRATION RATE: 19 BRPM | WEIGHT: 245 LBS | BODY MASS INDEX: 36.71 KG/M2

## 2021-08-03 NOTE — ANESTHESIA PREPROCEDURE EVALUATION
Anesthesia Pre-Procedure Evaluation    Patient: Vitaly Mcarthur   MRN: [id number] : 1985        Preoperative Diagnosis: Acute appendicitis with localized peritonitis, unspecified whether abscess present, unspecified whether gangrene present, unspecified whether perforation present [K35.30]   Procedure : Procedure(s):  APPENDECTOMY, LAPAROSCOPIC     No past medical history on file.   Past Surgical History:   Procedure Laterality Date     HC REMOVE TONSILS/ADENOIDS,<12 Y/O      Description: Tonsillectomy With Adenoidectomy;  Recorded: 2014;     TESTICLE SURGERY      vein variscosity     TYMPANOSTOMY TUBE PLACEMENT      6-7 times     WISDOM TOOTH EXTRACTION        No Known Allergies   Social History     Tobacco Use     Smoking status: Never Smoker     Smokeless tobacco: Never Used   Substance Use Topics     Alcohol use: Yes     Comment: rarely      Wt Readings from Last 1 Encounters:   21 111.1 kg (245 lb)        Anesthesia Evaluation   Pt has had prior anesthetic.     No history of anesthetic complications       ROS/MED HX  ENT/Pulmonary:  - neg pulmonary ROS     Neurologic:  - neg neurologic ROS     Cardiovascular:  - neg cardiovascular ROS     METS/Exercise Tolerance:     Hematologic:  - neg hematologic  ROS     Musculoskeletal:  - neg musculoskeletal ROS     GI/Hepatic: Comment: appendicitis      Renal/Genitourinary:  - neg Renal ROS     Endo:  - neg endo ROS   (+) Obesity,     Psychiatric/Substance Use:  - neg psychiatric ROS     Infectious Disease:  - neg infectious disease ROS     Malignancy:  - neg malignancy ROS     Other:            Physical Exam    Airway        Mallampati: III   TM distance: > 3 FB   Neck ROM: full     Respiratory Devices and Support         Dental  no notable dental history         Cardiovascular   cardiovascular exam normal       Rhythm and rate: regular and normal     Pulmonary   pulmonary exam normal        breath sounds clear to auscultation           OUTSIDE  LABS:  CBC:   Lab Results   Component Value Date    WBC 15.9 (H) 08/02/2021    WBC 6.3 05/18/2021    HGB 14.9 08/02/2021    HGB 14.9 05/18/2021    HCT 45.2 08/02/2021    HCT 44.3 05/18/2021     08/02/2021     05/18/2021     BMP:   Lab Results   Component Value Date     08/02/2021     05/18/2021    POTASSIUM 4.3 08/02/2021    POTASSIUM 4.0 05/18/2021    CHLORIDE 107 08/02/2021    CHLORIDE 105 05/18/2021    CO2 23 08/02/2021    CO2 26 05/18/2021    BUN 13 08/02/2021    BUN 19 05/18/2021    CR 1.13 08/02/2021    CR 1.25 05/18/2021     08/02/2021    GLC 97 05/18/2021     COAGS: No results found for: PTT, INR, FIBR  POC: No results found for: BGM, HCG, HCGS  HEPATIC:   Lab Results   Component Value Date    ALBUMIN 4.1 03/30/2015     OTHER:   Lab Results   Component Value Date    PIPE 9.3 08/02/2021       Anesthesia Plan    ASA Status:  2, emergent       Anesthesia Type: General.     - Airway: ETT   Induction: Intravenous, RSI.           Consents    Anesthesia Plan(s) and associated risks, benefits, and realistic alternatives discussed. Questions answered and patient/representative(s) expressed understanding.     - Discussed with:  Patient         Postoperative Care            Comments:                Evonne Garrett MD

## 2021-08-03 NOTE — ANESTHESIA CARE TRANSFER NOTE
Patient: Vitaly Mcarthur    Procedure(s):  APPENDECTOMY, LAPAROSCOPIC    Diagnosis: Acute appendicitis with localized peritonitis, unspecified whether abscess present, unspecified whether gangrene present, unspecified whether perforation present [K35.30]  Diagnosis Additional Information: No value filed.    Anesthesia Type:   General     Note:    Oropharynx: oropharynx clear of all foreign objects  Level of Consciousness: awake  Oxygen Supplementation: face mask  Level of Supplemental Oxygen (L/min / FiO2): 8  Independent Airway: airway patency satisfactory and stable  Dentition: dentition unchanged  Vital Signs Stable: post-procedure vital signs reviewed and stable  Report to RN Given: handoff report given  Patient transferred to: PACU    Handoff Report: Identifed the Patient, Identified the Reponsible Provider, Reviewed the pertinent medical history, Discussed the surgical course, Reviewed Intra-OP anesthesia mangement and issues during anesthesia, Set expectations for post-procedure period and Allowed opportunity for questions and acknowledgement of understanding      Vitals:  Vitals Value Taken Time   /72 08/02/21 2228   Temp     Pulse 100 08/02/21 2228   Resp 30 08/02/21 2228   SpO2 99 % 08/02/21 2228   Vitals shown include unvalidated device data.    Electronically Signed By: SAM Peres CRNA  August 2, 2021  10:29 PM

## 2021-08-03 NOTE — ANESTHESIA PROCEDURE NOTES
Airway       Patient location during procedure: OR       Procedure Start/Stop Times: 8/2/2021 9:43 PM  Staff -        CRNA: Silvia Burden APRN CRNA       Performed By: CRNA  Consent for Airway        Urgency: elective  Indications and Patient Condition       Indications for airway management: candice-procedural         Mask difficulty assessment: 1 - vent by mask    Final Airway Details       Final airway type: endotracheal airway       Successful airway: ETT - single  Endotracheal Airway Details        ETT size (mm): 8.0       Successful intubation technique: direct laryngoscopy       DL Blade Type: Stout 2       Grade View of Cords: 1       Adjucts: stylet and tooth guard       Position: Right       Measured from: lips       Secured at (cm): 22       Bite block used: None    Post intubation assessment        Placement verified by: capnometry, equal breath sounds and chest rise        Number of attempts at approach: 1       Number of other approaches attempted: 0       Secured with: silk tape       Ease of procedure: easy       Dentition: Intact

## 2021-08-03 NOTE — ED PROVIDER NOTES
EMERGENCY DEPARTMENT ENCOUNTER      NAME: Vitaly Mcarthur  AGE: 36 year old male  YOB: 1985  MRN: [id number]  EVALUATION DATE & TIME: No admission date for patient encounter.    PCP: Hillary Naylor    ED PROVIDER: Aarti Eduardo M.D.      CHIEF COMPLAINT:  Chief Complaint   Patient presents with     Abdominal Pain     Vomiting         FINAL IMPRESSION:  1. Acute appendicitis with localized peritonitis, unspecified whether abscess present, unspecified whether gangrene present, unspecified whether perforation present          ED COURSE & MEDICAL DECISION MAKIN year old male right lower quadrant abdominal pain.  The pain started this morning.  The patient has had nausea and vomiting and decreased appetite.  Patient denies history of abdominal surgeries.  Patient is tender in the right lower quadrant on exam.  He is otherwise well-appearing and nontoxic.  Laboratory studies demonstrate a leukocytosis, CT scan of the abdomen pelvis is consistent with a retrocecal appendicitis.  Patient initiated on IV antibiotics.  I spoke with the general surgeon who will plan for surgery this evening.    7:34 PM I met the patient and performed my initial interview and exam.   7:51 PM I spoke with Dr. Otto, surgery, regarding patient.     At the conclusion of the encounter I discussed the results of all of the tests and the disposition. The questions were answered. The patient or family acknowledged understanding and was agreeable with the care plan.     MEDICATIONS GIVEN IN THE EMERGENCY:  Medications   ondansetron (ZOFRAN) injection 4 mg (4 mg Intravenous Given 21)   ketorolac (TORADOL) injection 15 mg (15 mg Intravenous Given 21)   iopamidol (ISOVUE-370) solution 100 mL (100 mLs Intravenous Given 21)   piperacillin-tazobactam (ZOSYN) 3.375 g vial to attach to  mL bag (3.375 g Intravenous New Bag 21)       NEW PRESCRIPTIONS STARTED AT TODAY'S ER  "VISIT:  New Prescriptions    No medications on file          =================================================================    HPI  Vitaly Mcarthur is a 36 year old male with no pertinent history who presents to the ED by walk in for evaluation of abdominal pain.    Patient endorses right lower abdominal pain since this morning. He states the abdominal pain can be sharp, rating it 7/10 in severity. Patient reports when he awoke from a nap his \"stomach felt on fire\". He states he had one episode of vomiting while at work. Patient states he cannot get comfortable and has to hunch over due to the pain. Patient also endorses chills and a decrease in appetite. He reports he last ate last night (08/01). He denies any history of abdominal surgeries. Patient denies any medication problems. He does not smoke tobacco. Denies fever, cough, congestion, or any other complaints at this time.     I, Alondra Crespo am serving as a scribe to document services personally performed by Dr. Aarti Eduardo M.D., based on my observation and the provider's statements to me. I, Dr. Aarti Eduardo M.D. attest that Alondra Crespo is acting in a scribe capacity, has observed my performance of the services and has documented them in accordance with my direction.      REVIEW OF SYSTEMS   Constitutional: Denies fever, unintentional weight loss or fatigue. Positive for chills and decrease in appetite.  Eyes: Denies visual changes or discharge    HENT: Denies sore throat, ear pain or neck pain  Respiratory: Denies cough or shortness of breath    Cardiovascular: Denies chest pain, palpitations or leg swelling  GI: Denies nausea or dark, bloody stools. Positive for abdominal pain (RLQ) and vomiting.   : Denies hematuria, dysuria, or flank pain  Musculoskeletal: Denies any new back pain or new muscle/joint pains  Skin: Denies rash or wound  Neurologic: Denies current headache, new weakness, focal weakness, or sensory changes  "   Lymphatic: Denies swollen glands    Psychiatric: Denies depression, suicidal ideation or homicidal ideation.    Remainder of systems reviewed, unless noted in HPI all others negative.      PAST MEDICAL HISTORY:  No past medical history on file.    PAST SURGICAL HISTORY:  Past Surgical History:   Procedure Laterality Date     HC REMOVE TONSILS/ADENOIDS,<12 Y/O      Description: Tonsillectomy With Adenoidectomy;  Recorded: 11/25/2014;     TESTICLE SURGERY      vein variscosity     TYMPANOSTOMY TUBE PLACEMENT      6-7 times     WISDOM TOOTH EXTRACTION             CURRENT MEDICATIONS:    Prior to Admission medications    Medication Sig Start Date End Date Taking? Authorizing Provider   ascorbic acid, vitamin C, (VITAMIN C) 500 MG tablet [ASCORBIC ACID, VITAMIN C, (VITAMIN C) 500 MG TABLET] Take 500 mg by mouth. 11/11/15   Provider, Historical   cetirizine (ZYRTEC) 10 MG tablet [CETIRIZINE (ZYRTEC) 10 MG TABLET] Take 10 mg by mouth daily. 11/21/19   Provider, Historical   cholecalciferol, vitamin D3, (CHOLECALCIFEROL) 1,000 unit (25 mcg) tablet [CHOLECALCIFEROL, VITAMIN D3, (CHOLECALCIFEROL) 1,000 UNIT (25 MCG) TABLET] Take 1,000 Units by mouth daily. 11/21/19   Provider, Historical   multivitamin (ONE A DAY) per tablet [MULTIVITAMIN (ONE A DAY) PER TABLET] Take 1 tablet by mouth. 11/11/15   Provider, Historical   omega-3/dha/epa/fish oil (FISH OIL-OMEGA-3 FATTY ACIDS) 300-1,000 mg capsule [OMEGA-3/DHA/EPA/FISH OIL (FISH OIL-OMEGA-3 FATTY ACIDS) 300-1,000 MG CAPSULE] Take 2 g by mouth daily. 11/21/19   Provider, Historical   ubidecarenone (CO Q-10 ORAL) [UBIDECARENONE (CO Q-10 ORAL)] Take by mouth. 11/21/19   Provider, Historical   ZINC ORAL [ZINC ORAL] Take by mouth. 11/21/19   Provider, Historical         ALLERGIES:  No Known Allergies    FAMILY HISTORY:  Family History   Problem Relation Age of Onset     Myocardial Infarction Maternal Grandmother      Cancer Cousin         prevalent in mother's side      Cerebrovascular Disease Paternal Grandmother      Cerebrovascular Disease Paternal Grandfather      Hypertension Mother      Diabetes Type 2  Mother      Hyperlipidemia Mother      No Known Problems Father         estranged       SOCIAL HISTORY:   Social History     Socioeconomic History     Marital status:      Spouse name: Not on file     Number of children: Not on file     Years of education: Not on file     Highest education level: Not on file   Occupational History     Not on file   Tobacco Use     Smoking status: Never Smoker     Smokeless tobacco: Never Used   Substance and Sexual Activity     Alcohol use: Yes     Comment: rarely     Drug use: Never     Sexual activity: Not Currently     Partners: Female   Other Topics Concern     Not on file   Social History Narrative     Not on file     Social Determinants of Health     Financial Resource Strain:      Difficulty of Paying Living Expenses:    Food Insecurity:      Worried About Running Out of Food in the Last Year:      Ran Out of Food in the Last Year:    Transportation Needs:      Lack of Transportation (Medical):      Lack of Transportation (Non-Medical):    Physical Activity:      Days of Exercise per Week:      Minutes of Exercise per Session:    Stress:      Feeling of Stress :    Social Connections:      Frequency of Communication with Friends and Family:      Frequency of Social Gatherings with Friends and Family:      Attends Worship Services:      Active Member of Clubs or Organizations:      Attends Club or Organization Meetings:      Marital Status:    Intimate Partner Violence:      Fear of Current or Ex-Partner:      Emotionally Abused:      Physically Abused:      Sexually Abused:        PHYSICAL EXAM    /80   Pulse 103   Temp 99.3  F (37.4  C) (Oral)   Resp 21   Wt 111.1 kg (245 lb)   SpO2 96%   BMI 36.71 kg/m    General presentation: Alert, Vital signs reviewed. NAD  HENT: ENT inspection is normal. Oropharynx is moist  and clear.   Eye: Pupils are equal and reactive to light. EOMI  Neck: The neck is supple, with full ROM, with no evidence of meningismus.  Pulmonary: Currently in no acute respiratory distress. Normal, non labored respirations, the lung sounds are normal with good equal air movement. Clear to auscultation bilaterally.   Circulatory: Regular rate and rhythm. Peripheral pulses are strong and equal. No murmurs, rubs, or gallops.   Abdominal: The abdomen is soft. No rigidity, guarding, or rebound. Bowel sounds normal. Tenderness in right lower quadrant.   Neurologic: Alert, oriented to person, place, and time. No motor deficit. No sensory deficit. Cranial nerves II through XII are intact.  Musculoskeletal: No extremity tenderness. Full range of motion in all extremities. No extremity edema.   Skin: Skin color is normal. No rash. Warm. Dry to touch.        LAB:  All pertinent labs reviewed and interpreted.  Labs Ordered and Resulted from Time of ED Arrival Up to the Time of Departure from the ED   CBC WITH PLATELETS AND DIFFERENTIAL - Abnormal; Notable for the following components:       Result Value    WBC Count 15.9 (*)     Absolute Neutrophils 14.0 (*)     Absolute Lymphocytes 0.7 (*)     Absolute Immature Granulocytes 0.1 (*)     All other components within normal limits   BASIC METABOLIC PANEL - Normal   SARS-COV2 (COVID-19) VIRUS RT-PCR - Normal    Narrative:     Testing was performed using the sierra  SARS-CoV-2 & Influenza A/B Assay on the sierra  [employer]  System.  This test should be ordered for the detection of SARS-COV-2 in individuals who meet SARS-CoV-2 clinical and/or epidemiological criteria. Test performance is unknown in asymptomatic patients.  This test is for in vitro diagnostic use under the FDA EUA for laboratories certified under CLIA to perform moderate and/or high complexity testing. This test has not been FDA cleared or approved.  A negative test does not rule out the presence of PCR inhibitors in the  specimen or target RNA in concentration below the limit of detection for the assay. The possibility of a false negative should be considered if the patient's recent exposure or clinical presentation suggests COVID-19.  [facility] are certified under the Clinical Laboratory Improvement Amendments of 1988 (CLIA-88) as qualified to perform moderate and/or high complexity laboratory testing.   EXTRA GREEN TOP (LITHIUM HEPARIN) TUBE   EXTRA PURPLE TOP TUBE   ROUTINE UA WITH MICROSCOPIC REFLEX TO CULTURE   PERIPHERAL IV CATHETER   CALL   NO   COVID-19 VIRUS (CORONAVIRUS) BY PCR    Narrative:     The following orders were created for panel order Asymptomatic COVID-19 Virus (Coronavirus) by PCR Nasopharyngeal.  Procedure                               Abnormality         Status                     ---------                               -----------         ------                     SARS-COV2 (COVID-19) Vir...[[id number]]  Normal              Final result                 Please view results for these tests on the individual orders.   EXTRA TUBE    Narrative:     The following orders were created for panel order [place] Draw.  Procedure                               Abnormality         Status                     ---------                               -----------         ------                     Extra Green Top (Lithium...[[id number]]                      Final result               Extra Purple Top Tube[[id number]]                            Final result                 Please view results for these tests on the individual orders.   CBC WITH PLATELETS & DIFFERENTIAL    Narrative:     The following orders were created for panel order CBC with platelets differential.  Procedure                               Abnormality         Status                     ---------                               -----------         ------                     CBC with platelets and d...[[id number]]  Abnormal            Final  result                 Please view results for these tests on the individual orders.       RADIOLOGY:  Reviewed all pertinent imaging. Please see official radiology reports  CT Abdomen Pelvis w Contrast   Final Result   IMPRESSION:    1.  Acute retrocecal appendicitis.            EKG:    Performed at: 20:24  Impression: Sinus rhythm. Normal ECG.   Rate: 99 bpm  Rhythm: Sinus rhythm  Axis: -9  WV Interval: 160 ms  QRS Interval: 86 ms  QTc Interval: 433 ms  ST Changes: NA  Comparison: No previous ECGs available.    I have independently reviewed and interpreted the EKG(s) documented above.        I, Alondra Crespo, am serving as a scribe to document services personally performed by Dr. Aarti Eduardo M.D. based on my observation and the provider's statements to me. I, Aarti Eduardo M.D. attest that Alondra Crespo is acting in a scribe capacity, has observed my performance of the services and has documented them in accordance with my direction.    Aarti Eduardo M.D.  Emergency Medicine  [facility] EMERGENCY ROOM  [address] [place] [address]  [phone]  Dept: [phone]       Aarti Eduardo MD  08/02/21 8207

## 2021-08-03 NOTE — OP NOTE
Operative Note    Name:  Vitaly BLAND Blue  PCP:  Hillary Naylor  Procedure Date:  8/2/2021      Laparoscopic appendectomy    Pre-Procedure Diagnosis:  Acute appendicitis with localized peritonitis, unspecified whether abscess present, unspecified whether gangrene present, unspecified whether perforation present [K35.30]     Post-Procedure Diagnosis:    same    Surgeon(s):  Harshil Otto MD    Circulator: Marianne Berrios RN  Scrub Person: Cely Rios    Anesthesia Type:  General    No past medical history on file.    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  The patient was taken to the operating room and placed in supine position and general anesthesia was administered by anesthesia staff.  A briefing was performed. A Beasley was placed. The patient was prepped and draped in a sterile manner.  A timeout was performed and the OR staff.  An incision was made at the umbilicus.  The Visiport was used to gain access to the peritoneal cavity.  It was insufflated to a pressure of 15 mm of CO2.  Under direct visualization a 5 mm port was placed above the pubic tubercle and a 5 mm port was placed in the right lower quadrant.  The appendix was identified and freed from surrounding adhesions.  The mesial appendix was taken down with harmonic scapel.   The appendix was ligated at its with 2-0 Vicryl  Endoloops and divided between.  The appendix was placed in an Endo Catch bag and brought through the lower 5mm trocar.  The operative area was irrigated out with normal saline and antibiotic solution.  This was removed.  20 mL of Marcaine was left in the operative bed.  All trochars and CO2 were removed and evacuated.  .. The incisions were closed with a 4-0 Monocryl suture in a subcuticular fashion.  10 mL of Marcaine were injected in the incisions Steri-Strips and sterile dressings were applied.  The Beasley was removed the patient was extubated and transferred to PACU in stable condition.  All sponge and needle counts were correct.      Estimated Blood Loss:   5 ml    Specimens:    ID Type Source Tests Collected by Time Destination   1 :  Tissue Appendix SURGICAL PATHOLOGY EXAM Harshil Otto MD 8/2/2021 10:07 PM           Drains:   Urethral Catheter Latex 16 fr (Active)       Complications:    None    Harshil Otto MD   [facility] Surgery    Date: 8/2/2021  Time: 10:25 PM

## 2021-08-03 NOTE — PHARMACY-ADMISSION MEDICATION HISTORY
Pharmacy Note - Admission Medication History    Pertinent Provider Information:      ______________________________________________________________________    Prior To Admission (PTA) med list completed and updated in EMR.       PTA Med List   Medication Sig Last Dose     ascorbic acid, vitamin C, (VITAMIN C) 500 MG tablet Take 500 mg by mouth daily  8/2/2021 at AM     cetirizine (ZYRTEC) 10 MG tablet [CETIRIZINE (ZYRTEC) 10 MG TABLET] Take 10 mg by mouth daily. 8/2/2021 at AM     cholecalciferol, vitamin D3, (CHOLECALCIFEROL) 1,000 unit (25 mcg) tablet [CHOLECALCIFEROL, VITAMIN D3, (CHOLECALCIFEROL) 1,000 UNIT (25 MCG) TABLET] Take 1,000 Units by mouth daily. 8/2/2021 at AM     multivitamin (ONE A DAY) per tablet Take 1 tablet by mouth daily  8/2/2021 at AM     omega-3/dha/epa/fish oil (FISH OIL-OMEGA-3 FATTY ACIDS) 300-1,000 mg capsule [OMEGA-3/DHA/EPA/FISH OIL (FISH OIL-OMEGA-3 FATTY ACIDS) 300-1,000 MG CAPSULE] Take 2 g by mouth daily. 8/2/2021 at AM     ubidecarenone (CO Q-10 ORAL) Take 1 capsule by mouth daily  8/2/2021 at AM     ZINC ORAL Take 1 tablet by mouth daily  8/2/2021 at AM       Information source(s): [facility]  Method of interview communication: in-person    Summary of Changes to PTA Med List  New: n/a  Discontinued: n/a  Changed: n/a    Patient was asked about OTC/herbal products specifically.  PTA med list reflects this.    In the past week, patient estimated taking medication this percent of the time:  greater than 90%.    Allergies were reviewed, assessed, and updated with the patient.      Patient does not anticipate needing any multi-use medications during admission.    The information provided in this note is only as accurate as the sources available at the time of the update(s).    Thank you for the opportunity to participate in the care of this patient.    Ilene Burks  8/2/2021 8:57 PM

## 2021-08-03 NOTE — ANESTHESIA POSTPROCEDURE EVALUATION
Patient: Vitaly Mcarthur    Procedure(s):  APPENDECTOMY, LAPAROSCOPIC    Diagnosis:Acute appendicitis with localized peritonitis, unspecified whether abscess present, unspecified whether gangrene present, unspecified whether perforation present [K35.30]  Diagnosis Additional Information: No value filed.    Anesthesia Type:  General    Note:  Disposition: Inpatient   Postop Pain Control: Uneventful            Sign Out: Well controlled pain   PONV: No   Neuro/Psych: Uneventful            Sign Out: Acceptable/Baseline neuro status   Airway/Respiratory: Uneventful            Sign Out: Acceptable/Baseline resp. status   CV/Hemodynamics: Uneventful            Sign Out: Acceptable CV status; No obvious hypovolemia; No obvious fluid overload   Other NRE: NONE   DID A NON-ROUTINE EVENT OCCUR? No           Last vitals:  Vitals Value Taken Time   /57 08/02/21 2250   Temp 37.1  C (98.8  F) 08/02/21 2228   Pulse 96 08/02/21 2251   Resp 25 08/02/21 2251   SpO2 93 % 08/02/21 2251   Vitals shown include unvalidated device data.    Electronically Signed By: Evonne Garrett MD  August 2, 2021  10:53 PM

## 2021-08-03 NOTE — ED NOTES
RLQ x today. Bloated, nausea + emesis, and decreased appetite. Last ate 8/1. Chilled. Pain is 8/10, cramping

## 2021-08-03 NOTE — CONSULTS
[facility] Surgery Consultation    Vitaly Mcarthur MRN# [id number]   Age: 36 year old YOB: 1985     Date of Admission:  8/2/2021    Reason for consult: Appendicitis       Requesting physician: Aarti Eduardo MD       Level of consult: Consult, follow and place orders           Assessment and Plan:   Assessment:   Appendicitis      Plan:   Appendectomy             Chief Complaint:   Abdominal pain     History is obtained from the patient         History of Present Illness:   36-year-old gentleman comes with right lower quadrant abdominal pain pain started this morning   Had nausea and vomiting decreased appetite no other real surgeries or other history.  He came into the emergency room for evaluation slightly tender on the right lower abdomen ER work-up got some labs done shows elevated white count be obtained and a CT scan of his abdomen shows he is a retrocecal appendix he has been started on IV of antibiotics have been consulted.  There are no beds in the hospital at this time point so he does need appendix removed appears to be stable and will take his appendix out hopefully home this evening.       Past Medical History:   I have reviewed this patient's past medical history  No past medical history on file.          Past Surgical History:   This patient has no significant past surgical history          Social History:     Social History     Tobacco Use     Smoking status: Never Smoker     Smokeless tobacco: Never Used   Substance Use Topics     Alcohol use: Yes     Comment: rarely             Family History:     Family History   Problem Relation Age of Onset     Myocardial Infarction Maternal Grandmother      Cancer Cousin         prevalent in mother's side     Cerebrovascular Disease Paternal Grandmother      Cerebrovascular Disease Paternal Grandfather      Hypertension Mother      Diabetes Type 2  Mother      Hyperlipidemia Mother      No Known Problems Father         estranged      Family history reviewed          Immunizations:   Immunization status is unknown          Allergies:   All allergies reviewed and addressed          Medications:     No current facility-administered medications for this encounter.     Current Outpatient Medications   Medication Sig     ascorbic acid, vitamin C, (VITAMIN C) 500 MG tablet Take 500 mg by mouth daily      cetirizine (ZYRTEC) 10 MG tablet [CETIRIZINE (ZYRTEC) 10 MG TABLET] Take 10 mg by mouth daily.     cholecalciferol, vitamin D3, (CHOLECALCIFEROL) 1,000 unit (25 mcg) tablet [CHOLECALCIFEROL, VITAMIN D3, (CHOLECALCIFEROL) 1,000 UNIT (25 MCG) TABLET] Take 1,000 Units by mouth daily.     multivitamin (ONE A DAY) per tablet Take 1 tablet by mouth daily      omega-3/dha/epa/fish oil (FISH OIL-OMEGA-3 FATTY ACIDS) 300-1,000 mg capsule [OMEGA-3/DHA/EPA/FISH OIL (FISH OIL-OMEGA-3 FATTY ACIDS) 300-1,000 MG CAPSULE] Take 2 g by mouth daily.     ubidecarenone (CO Q-10 ORAL) Take 1 capsule by mouth daily      ZINC ORAL Take 1 tablet by mouth daily              Review of Systems:   The Review of Systems is negative other than noted in the HPI          Physical Exam:   Vitals were reviewed  Temp: 99.3  F (37.4  C) Temp src: Oral BP: 118/80 Pulse: 103   Resp: 21 SpO2: 96 %      Constitutional:   awake, alert, cooperative, no apparent distress, and appears stated age     Eyes:   Lids and lashes normal, pupils equal, round and reactive to light, extra ocular muscles intact, sclera clear, conjunctiva normal     ENT:   Normocephalic, without obvious abnormality, atraumatic, sinuses nontender on palpation, external ears without lesions, oral pharynx with moist mucous membranes, tonsils without erythema or exudates, gums normal and good dentition.     Neck:   Supple, symmetrical, trachea midline, no adenopathy, thyroid symmetric, not enlarged and no tenderness, skin normal     Back:   Symmetric, no curvature, spinous processes are non-tender on palpation, paraspinous  muscles are non-tender on palpation, no costal vertebral tenderness     Lungs:   No increased work of breathing, good air exchange, clear to auscultation bilaterally, no crackles or wheezing     Cardiovascular:   Normal apical impulse, regular rate and rhythm, normal S1 and S2, no S3 or S4, and no murmur noted     Abdomen:   No scars, normal bowel sounds, soft, non-distended, tender right lower abodmen     Musculoskeletal:   There is no redness, warmth, or swelling of the joints.  Full range of motion noted.  Motor strength is 5 out of 5 all extremities bilaterally.  Tone is normal.     Neurologic:   Awake, alert, oriented to name, place and time.  Cranial nerves II-XII are grossly intact.  Motor is 5 out of 5 bilaterally.  Cerebellar finger to nose, heel to shin intact.  Sensory is intact.  Babinski down going, Romberg negative, and gait is normal.     Skin:   no bruising or bleeding and normal skin color, texture, turgor             Data:   All laboratory data reviewed  Lab Results   Component Value Date    WBC 15.9 (H) 08/02/2021    HGB 14.9 08/02/2021    HCT 45.2 08/02/2021     08/02/2021     08/02/2021    POTASSIUM 4.3 08/02/2021    CHLORIDE 107 08/02/2021    CO2 23 08/02/2021    BUN 13 08/02/2021    CR 1.13 08/02/2021     08/02/2021     CT scan of the abdomen:   Appendix is inflammed   CT scan interpreted by radiologist        Attestation:  I have reviewed today's vital signs, notes, medications, labs and imaging.    Harshil Otto MD

## 2021-08-04 LAB
ATRIAL RATE - MUSE: 99 BPM
DIASTOLIC BLOOD PRESSURE - MUSE: NORMAL MMHG
INTERPRETATION ECG - MUSE: NORMAL
P AXIS - MUSE: 49 DEGREES
PATH REPORT.COMMENTS IMP SPEC: NORMAL
PATH REPORT.COMMENTS IMP SPEC: NORMAL
PATH REPORT.FINAL DX SPEC: NORMAL
PATH REPORT.GROSS SPEC: NORMAL
PATH REPORT.MICROSCOPIC SPEC OTHER STN: NORMAL
PATH REPORT.RELEVANT HX SPEC: NORMAL
PHOTO IMAGE: NORMAL
PR INTERVAL - MUSE: 160 MS
QRS DURATION - MUSE: 86 MS
QT - MUSE: 338 MS
QTC - MUSE: 433 MS
R AXIS - MUSE: -9 DEGREES
SYSTOLIC BLOOD PRESSURE - MUSE: NORMAL MMHG
T AXIS - MUSE: 26 DEGREES
VENTRICULAR RATE- MUSE: 99 BPM

## 2021-08-04 PROCEDURE — 88304 TISSUE EXAM BY PATHOLOGIST: CPT | Mod: 26 | Performed by: PATHOLOGY

## 2021-10-03 ENCOUNTER — HEALTH MAINTENANCE LETTER (OUTPATIENT)
Age: 36
End: 2021-10-03

## 2022-07-10 ENCOUNTER — HEALTH MAINTENANCE LETTER (OUTPATIENT)
Age: 37
End: 2022-07-10

## 2022-09-11 ENCOUNTER — HEALTH MAINTENANCE LETTER (OUTPATIENT)
Age: 37
End: 2022-09-11

## 2023-07-29 ENCOUNTER — HEALTH MAINTENANCE LETTER (OUTPATIENT)
Age: 38
End: 2023-07-29

## 2025-06-24 ENCOUNTER — TRANSCRIBE ORDERS (OUTPATIENT)
Dept: OTHER | Age: 40
End: 2025-06-24

## 2025-06-24 ENCOUNTER — MEDICAL CORRESPONDENCE (OUTPATIENT)
Dept: HEALTH INFORMATION MANAGEMENT | Facility: CLINIC | Age: 40
End: 2025-06-24
Payer: COMMERCIAL

## 2025-06-24 DIAGNOSIS — R68.89 NASAL AIRWAY ABNORMALITY: ICD-10-CM

## 2025-06-24 DIAGNOSIS — G47.33 OSA (OBSTRUCTIVE SLEEP APNEA): Primary | ICD-10-CM

## 2025-06-24 DIAGNOSIS — J98.4 SMALL AIRWAYS DISEASE: ICD-10-CM

## (undated) DEVICE — SOL RINGERS LACTATED 1000ML BAG 2B2324X

## (undated) DEVICE — SU MONOCRYL+ 4-0 18IN PS2 UND MCP496G

## (undated) DEVICE — SUTURE ENDO SURGITIE 21 POLYSORB EL23LN

## (undated) DEVICE — GLOVE BIOGEL PI ORTHOPRO SZ 7.5 47675

## (undated) DEVICE — PREP DURAPREP 26ML APL 8630

## (undated) DEVICE — BASIN EMESIS STERILE  SSK9005A

## (undated) DEVICE — ESU LIGASURE MARYLAND LAPAROSCOPIC SLR/DVDR 5MMX37CM LF1937

## (undated) DEVICE — MITT PRE-OP 7 L X 5 1/2 W 5177M1

## (undated) DEVICE — SOL WATER IRRIG 1000ML BOTTLE 2F7114

## (undated) DEVICE — PLATE GROUNDING ADULT W/CORD 9165L

## (undated) DEVICE — ENDO POUCH UNIVERSAL RETRIEVAL SYSTEM INZII 5MM CD003

## (undated) DEVICE — CUSTOM PACK LAP CHOLE SBA5BLCHEA

## (undated) DEVICE — SUCTION MANIFOLD NEPTUNE 2 SYS 1 PORT 702-025-000

## (undated) DEVICE — DECANTER VIAL 2006S

## (undated) DEVICE — ENDO TROCAR FIRST ENTRY KII FIOS Z-THRD 05X100MM CTF03

## (undated) DEVICE — ENDO SHEARS RENEW LAP ENDOCUT SCISSOR TIP 16.5MM 3142

## (undated) DEVICE — WARMER SCOPE DISPOSABLE DLW510

## (undated) DEVICE — TUBING LAP SUCT/IRRIG STRYKER 250070500

## (undated) DEVICE — BLADE CLIPPER PIVOT PURPLE DISP 9660

## (undated) DEVICE — CATH TRAY FOLEY SURESTEP 16FR DRAIN BAG STATOCK A899916

## (undated) DEVICE — ENDO TROCAR SLEEVE KII Z-THREADED 05X100MM CTS02

## (undated) DEVICE — TUBING SMOKE EVAC PLUME PORT PP010CS

## (undated) DEVICE — Device